# Patient Record
Sex: FEMALE | Race: WHITE | NOT HISPANIC OR LATINO | Employment: FULL TIME | URBAN - METROPOLITAN AREA
[De-identification: names, ages, dates, MRNs, and addresses within clinical notes are randomized per-mention and may not be internally consistent; named-entity substitution may affect disease eponyms.]

---

## 2022-12-05 ENCOUNTER — WALK IN (OUTPATIENT)
Dept: URGENT CARE | Age: 32
End: 2022-12-05

## 2022-12-05 VITALS
WEIGHT: 163.58 LBS | BODY MASS INDEX: 25.67 KG/M2 | TEMPERATURE: 99.1 F | HEIGHT: 67 IN | OXYGEN SATURATION: 100 % | HEART RATE: 72 BPM | RESPIRATION RATE: 16 BRPM

## 2022-12-05 DIAGNOSIS — J00 ACUTE NASOPHARYNGITIS: Primary | ICD-10-CM

## 2022-12-05 LAB
INTERNAL PROCEDURAL CONTROLS ACCEPTABLE: YES
SARS-COV+SARS-COV-2 AG RESP QL IA.RAPID: NOT DETECTED
TEST LOT EXPIRATION DATE: NORMAL
TEST LOT NUMBER: NORMAL

## 2022-12-05 PROCEDURE — 87426 SARSCOV CORONAVIRUS AG IA: CPT | Performed by: NURSE PRACTITIONER

## 2022-12-05 PROCEDURE — 99213 OFFICE O/P EST LOW 20 MIN: CPT | Performed by: NURSE PRACTITIONER

## 2022-12-05 ASSESSMENT — ENCOUNTER SYMPTOMS
SINUS PAIN: 0
SORE THROAT: 0
SINUS PRESSURE: 0
GASTROINTESTINAL NEGATIVE: 1
FEVER: 0
HEADACHES: 1
RESPIRATORY NEGATIVE: 1
RHINORRHEA: 1
CHILLS: 0
CONSTITUTIONAL NEGATIVE: 1
FATIGUE: 0
EYES NEGATIVE: 1
PSYCHIATRIC NEGATIVE: 1
DIAPHORESIS: 0

## 2023-01-12 ENCOUNTER — LAB REQUISITION (OUTPATIENT)
Dept: OBGYN | Age: 33
End: 2023-01-12

## 2023-01-12 DIAGNOSIS — Z12.4 ENCOUNTER FOR SCREENING FOR MALIGNANT NEOPLASM OF CERVIX: ICD-10-CM

## 2023-01-12 PROCEDURE — 88175 CYTOPATH C/V AUTO FLUID REDO: CPT | Performed by: CLINICAL MEDICAL LABORATORY

## 2023-01-12 PROCEDURE — 87624 HPV HI-RISK TYP POOLED RSLT: CPT | Performed by: CLINICAL MEDICAL LABORATORY

## 2023-01-16 LAB
CASE RPRT: NORMAL
CLINICAL INFO: NORMAL
CYTOLOGY CVX/VAG STUDY: NORMAL
HPV16+18+45 E6+E7MRNA CVX NAA+PROBE: NEGATIVE
Lab: NORMAL
PAP EDUCATIONAL NOTE: NORMAL
SPECIMEN ADEQUACY: NORMAL

## 2023-04-17 ENCOUNTER — LAB SERVICES (OUTPATIENT)
Dept: LAB | Age: 33
End: 2023-04-17

## 2023-04-17 DIAGNOSIS — R53.83 FATIGUE: ICD-10-CM

## 2023-04-17 DIAGNOSIS — N93.9 ABNORMAL UTERINE BLEEDING: Primary | ICD-10-CM

## 2023-04-17 LAB
BASOPHILS # BLD: 0 K/MCL (ref 0–0.3)
BASOPHILS NFR BLD: 0 %
DEPRECATED RDW RBC: 42.5 FL (ref 39–50)
EOSINOPHIL # BLD: 0 K/MCL (ref 0–0.5)
EOSINOPHIL NFR BLD: 0 %
ERYTHROCYTE [DISTWIDTH] IN BLOOD: 12.3 % (ref 11–15)
HCT VFR BLD CALC: 40.2 % (ref 36–46.5)
HGB BLD-MCNC: 13 G/DL (ref 12–15.5)
IMM GRANULOCYTES # BLD AUTO: 0 K/MCL (ref 0–0.2)
IMM GRANULOCYTES # BLD: 0 %
LYMPHOCYTES # BLD: 1.7 K/MCL (ref 1–4.8)
LYMPHOCYTES NFR BLD: 26 %
MCH RBC QN AUTO: 30 PG (ref 26–34)
MCHC RBC AUTO-ENTMCNC: 32.3 G/DL (ref 32–36.5)
MCV RBC AUTO: 92.6 FL (ref 78–100)
MONOCYTES # BLD: 0.4 K/MCL (ref 0.3–0.9)
MONOCYTES NFR BLD: 5 %
NEUTROPHILS # BLD: 4.6 K/MCL (ref 1.8–7.7)
NEUTROPHILS NFR BLD: 69 %
NRBC BLD MANUAL-RTO: 0 /100 WBC
PLATELET # BLD AUTO: 249 K/MCL (ref 140–450)
RBC # BLD: 4.34 MIL/MCL (ref 4–5.2)
TSH SERPL-ACNC: 0.9 MCUNITS/ML (ref 0.35–5)
WBC # BLD: 6.8 K/MCL (ref 4.2–11)

## 2023-04-17 PROCEDURE — 85025 COMPLETE CBC W/AUTO DIFF WBC: CPT | Performed by: CLINICAL MEDICAL LABORATORY

## 2023-04-17 PROCEDURE — 36415 COLL VENOUS BLD VENIPUNCTURE: CPT | Performed by: CLINICAL MEDICAL LABORATORY

## 2023-04-17 PROCEDURE — 84443 ASSAY THYROID STIM HORMONE: CPT | Performed by: CLINICAL MEDICAL LABORATORY

## 2023-05-02 ENCOUNTER — HOSPITAL ENCOUNTER (OUTPATIENT)
Dept: ULTRASOUND IMAGING | Age: 33
Discharge: HOME | End: 2023-05-02
Attending: OBSTETRICS & GYNECOLOGY

## 2023-05-02 DIAGNOSIS — N93.9 ABNORMAL UTERINE BLEEDING: ICD-10-CM

## 2023-05-02 PROCEDURE — 76856 US EXAM PELVIC COMPLETE: CPT

## 2023-10-04 ENCOUNTER — LAB SERVICES (OUTPATIENT)
Dept: LAB | Age: 33
End: 2023-10-04

## 2023-10-04 DIAGNOSIS — Z31.41 ENCOUNTER FOR FERTILITY TESTING: ICD-10-CM

## 2023-10-04 LAB
ESTRADIOL SERPL-MCNC: 46 PG/ML
FSH SERPL-ACNC: 10.6 MUNITS/ML

## 2023-10-04 PROCEDURE — 82670 ASSAY OF TOTAL ESTRADIOL: CPT | Performed by: CLINICAL MEDICAL LABORATORY

## 2023-10-04 PROCEDURE — 36415 COLL VENOUS BLD VENIPUNCTURE: CPT | Performed by: CLINICAL MEDICAL LABORATORY

## 2023-10-04 PROCEDURE — 83001 ASSAY OF GONADOTROPIN (FSH): CPT | Performed by: CLINICAL MEDICAL LABORATORY

## 2023-11-09 ENCOUNTER — V-VISIT (OUTPATIENT)
Dept: URGENT CARE | Age: 33
End: 2023-11-09

## 2023-11-09 VITALS
BODY MASS INDEX: 26.42 KG/M2 | HEIGHT: 67 IN | WEIGHT: 168.32 LBS | DIASTOLIC BLOOD PRESSURE: 72 MMHG | HEART RATE: 95 BPM | RESPIRATION RATE: 18 BRPM | TEMPERATURE: 98.2 F | SYSTOLIC BLOOD PRESSURE: 126 MMHG

## 2023-11-09 DIAGNOSIS — H65.01 NON-RECURRENT ACUTE SEROUS OTITIS MEDIA OF RIGHT EAR: ICD-10-CM

## 2023-11-09 DIAGNOSIS — U07.1 COVID-19 VIRUS INFECTION: Primary | ICD-10-CM

## 2023-11-09 LAB
FLUAV AG UPPER RESP QL IA.RAPID: NEGATIVE
FLUBV AG UPPER RESP QL IA.RAPID: NEGATIVE
SARS-COV+SARS-COV-2 AG RESP QL IA.RAPID: DETECTED
TEST LOT EXPIRATION DATE: ABNORMAL
TEST LOT NUMBER: ABNORMAL

## 2023-11-09 PROCEDURE — 99213 OFFICE O/P EST LOW 20 MIN: CPT | Performed by: NURSE PRACTITIONER

## 2023-11-09 PROCEDURE — 87428 SARSCOV & INF VIR A&B AG IA: CPT | Performed by: NURSE PRACTITIONER

## 2023-11-09 RX ORDER — VITAMIN A ACETATE, BETA CAROTENE, ASCORBIC ACID, CHOLECALCIFEROL, .ALPHA.-TOCOPHEROL ACETATE, DL-, THIAMINE MONONITRATE, RIBOFLAVIN, NIACINAMIDE, PYRIDOXINE HYDROCHLORIDE, FOLIC ACID, CYANOCOBALAMIN, CALCIUM CARBONATE, FERROUS FUMARATE, ZINC OXIDE, CUPRIC OXIDE 3080; 12; 120; 400; 1; 1.84; 3; 20; 22; 920; 25; 200; 27; 10; 2 [IU]/1; UG/1; MG/1; [IU]/1; MG/1; MG/1; MG/1; MG/1; MG/1; [IU]/1; MG/1; MG/1; MG/1; MG/1; MG/1
1 TABLET, FILM COATED ORAL DAILY
COMMUNITY

## 2023-11-09 ASSESSMENT — ENCOUNTER SYMPTOMS
COUGH: 0
HEADACHES: 1
FEVER: 0
SINUS PRESSURE: 1
SORE THROAT: 0
SINUS PAIN: 1

## 2023-12-22 ENCOUNTER — NURSE TRIAGE (OUTPATIENT)
Dept: TELEHEALTH | Age: 33
End: 2023-12-22

## 2023-12-22 ASSESSMENT — HEADACHE QUESTIONNAIRES
DID YOU FALL DOWN AND HIT YOUR HEAD, OR DID SOMEONE OR SOMETHING HIT YOU IN THE HEAD IN THE RECENT PAST?: N
WHICH OF THE FOLLOWING DESCRIBES THE LOCATION OF YOUR HEADACHE?: PAIN IN ONE SIDE OF MY HEAD
DO YOU TAKE MEDICATIONS THAT CAN SUPPRESS THE IMMUNE SYSTEM (CHEMOTHERAPY, STEROIDS, TRANSPLANT ANTIREJECTION MEDICATIONS)?: N
HAVE YOU EXPERIENCED ANY OF THE FOLLOWING: A MEDICATION WAS CHANGED/STARTED/STOPPED RECENTLY, NEW EYEGLASSES OR LOST EYEGLASSES?: NONE OF THE ABOVE
HOW LONG HAVE YOU HAD HEADACHES?: SEVERAL YEARS
ARE YOU TAKING ANY OF THE FOLLOWING FOR YOUR HEADACHE: ASPIRIN, TYLENOL OR ACETAMINOPHEN, MOTRIN/IBUPROFEN/ALEVE/NAPROXEN, PRESCRIPTION MEDICATION, SOMEONE ELSE'S MEDICATION?: MOTRIN/IBUPROFEN/ALEVE/NAPROXEN
HOW LONG HAS YOUR WORST RECENT HEADACHE LASTED?: 3–6 HOURS
ARE YOUR ALLERGIES UP-TO-DATE IN YOUR CHART?: Y
HAVE YOU RECENTLY USED OR STOPPED ANY OF THE FOLLOWING SUBSTANCES: COCAINE, THC, METHAMPHETAMINES, ALCOHOL, OPIOIDS, OXYCODONE OR MORPHINE?: N
WHICH OF THE FOLLOWING DESCRIBES YOUR HEADACHES?: PAIN THAT COMES AND GOES
DO YOU HAVE A HEADACHE WHEN YOU EAT A CERTAIN FOOD, TAKE A CERTAIN MEDICINE, OR HAVE A CERTAIN DRINK?: NO
PLEASE GIVE DETAILS ABOUT MEDICINES THAT HAVE HELPED YOUR HEADACHES.: ZOMIG
HAVE YOU EVER BEEN TOLD YOU HAVE A SPECIFIC TYPE OF HEADACHE?: YES
ARE YOU PREGNANT?: N
WHICH OF THE FOLLOWING DESCIBES YOUR HEADACHE?: A HISTORY OF HEADACHES
HOW LONG HAS YOU MOST RECENT HEADACHE BEEN GOING ON?: 3–6 HOURS
WAS THERE A MEDICINE THAT YOU TOOK IN THE PAST THAT HELPED YOUR HEADACHES?: YES
DO YOU HAVE ANY KNOWN ALLERGIES?: Y
ARE YOUR HEADACHES GETTING MORE FREQUENT WITH TIME?: I CAN'T TELL
ARE YOUR HEADACHES GETTING MORE INTENSE WITH TIME?: I CAN'T TELL
ARE YOU BREASTFEEDING?: N
DO YOU HAVE ANY CHRONIC ILLNESSES SUCH AS DIABETES, HEART DISEASE, LUNG DISEASE, OR ANY ILLNESS THAT WOULD WEAKEN YOUR ABILITY TO FIGHT INFECTION?: NO
ON A SCALE OF 1–10, WHERE 10 IS THE WORST HEADACHE YOU CAN IMAGINE, HOW PAINFUL WAS YOUR WORST RECENT HEADACHE?: 7
DO YOU HAVE ANY OF THE FOLLOWING: DEPRESSION/SADNESS, SLEEPING TOO MUCH OR NOT ENOUGH, CHANGE IN APPETITE?: NONE OF THE ABOVE
HAVE YOU SEEN A HEALTH CARE PROVIDER FOR THIS CONCERN WITHIN THE LAST 7 DAYS?: N

## 2023-12-26 ENCOUNTER — APPOINTMENT (OUTPATIENT)
Dept: INTERNAL MEDICINE | Age: 33
End: 2023-12-26

## 2023-12-26 VITALS
BODY MASS INDEX: 26.09 KG/M2 | DIASTOLIC BLOOD PRESSURE: 75 MMHG | SYSTOLIC BLOOD PRESSURE: 127 MMHG | HEART RATE: 72 BPM | WEIGHT: 166.2 LBS | HEIGHT: 67 IN | RESPIRATION RATE: 16 BRPM

## 2023-12-26 DIAGNOSIS — Z23 NEED FOR VACCINATION: ICD-10-CM

## 2023-12-26 DIAGNOSIS — H93.A9 PULSATILE TINNITUS: ICD-10-CM

## 2023-12-26 DIAGNOSIS — G43.009 MIGRAINE WITHOUT AURA AND WITHOUT STATUS MIGRAINOSUS, NOT INTRACTABLE: ICD-10-CM

## 2023-12-26 DIAGNOSIS — Z00.00 ANNUAL PHYSICAL EXAM: Primary | ICD-10-CM

## 2023-12-26 PROCEDURE — 99385 PREV VISIT NEW AGE 18-39: CPT | Performed by: INTERNAL MEDICINE

## 2023-12-26 PROCEDURE — 90471 IMMUNIZATION ADMIN: CPT | Performed by: INTERNAL MEDICINE

## 2023-12-26 PROCEDURE — 90686 IIV4 VACC NO PRSV 0.5 ML IM: CPT | Performed by: INTERNAL MEDICINE

## 2023-12-26 ASSESSMENT — ENCOUNTER SYMPTOMS
BRUISES/BLEEDS EASILY: 0
HEADACHES: 0
COUGH: 0
DIARRHEA: 0
SLEEP DISTURBANCE: 0
NAUSEA: 0
NERVOUS/ANXIOUS: 0
FEVER: 0
SHORTNESS OF BREATH: 0
VOMITING: 0
BLOOD IN STOOL: 0
DIZZINESS: 0
RHINORRHEA: 0
FATIGUE: 0
EYE PAIN: 0
WOUND: 0
BACK PAIN: 0
APPETITE CHANGE: 0
POLYDIPSIA: 0
CONSTIPATION: 0
ABDOMINAL PAIN: 0
UNEXPECTED WEIGHT CHANGE: 0

## 2023-12-26 ASSESSMENT — PATIENT HEALTH QUESTIONNAIRE - PHQ9
SUM OF ALL RESPONSES TO PHQ9 QUESTIONS 1 AND 2: 2
2. FEELING DOWN, DEPRESSED OR HOPELESS: SEVERAL DAYS
CLINICAL INTERPRETATION OF PHQ2 SCORE: NO FURTHER SCREENING NEEDED
1. LITTLE INTEREST OR PLEASURE IN DOING THINGS: SEVERAL DAYS
SUM OF ALL RESPONSES TO PHQ9 QUESTIONS 1 AND 2: 2

## 2024-01-12 ENCOUNTER — TELEPHONE (OUTPATIENT)
Dept: INTERNAL MEDICINE | Age: 34
End: 2024-01-12

## 2024-01-17 ENCOUNTER — TELEPHONE (OUTPATIENT)
Dept: INTERNAL MEDICINE | Age: 34
End: 2024-01-17

## 2024-12-23 ENCOUNTER — APPOINTMENT (OUTPATIENT)
Dept: GENERAL RADIOLOGY | Age: 34
End: 2024-12-23
Attending: PHYSICIAN ASSISTANT
Payer: COMMERCIAL

## 2024-12-23 ENCOUNTER — HOSPITAL ENCOUNTER (OUTPATIENT)
Age: 34
Discharge: HOME | End: 2024-12-23
Payer: COMMERCIAL

## 2024-12-23 VITALS
HEART RATE: 66 BPM | TEMPERATURE: 99 F | DIASTOLIC BLOOD PRESSURE: 77 MMHG | RESPIRATION RATE: 22 BRPM | SYSTOLIC BLOOD PRESSURE: 124 MMHG | OXYGEN SATURATION: 100 %

## 2024-12-23 DIAGNOSIS — S16.1XXA STRAIN OF NECK MUSCLE, INITIAL ENCOUNTER: ICD-10-CM
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
## 2024-12-23 LAB — B-HCG UR QL: NEGATIVE

## 2024-12-23 PROCEDURE — 99203 OFFICE O/P NEW LOW 30 MIN: CPT

## 2024-12-23 PROCEDURE — 81025 URINE PREGNANCY TEST: CPT

## 2024-12-23 PROCEDURE — 99204 OFFICE O/P NEW MOD 45 MIN: CPT

## 2024-12-23 PROCEDURE — 72040 X-RAY EXAM NECK SPINE 2-3 VW: CPT | Performed by: PHYSICIAN ASSISTANT
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
